# Patient Record
Sex: FEMALE | Race: BLACK OR AFRICAN AMERICAN | Employment: STUDENT | URBAN - METROPOLITAN AREA
[De-identification: names, ages, dates, MRNs, and addresses within clinical notes are randomized per-mention and may not be internally consistent; named-entity substitution may affect disease eponyms.]

---

## 2017-02-09 ENCOUNTER — HOSPITAL ENCOUNTER (EMERGENCY)
Age: 37
Discharge: HOME | End: 2017-02-09
Attending: EMERGENCY MEDICINE

## 2017-02-09 VITALS
BODY MASS INDEX: 33.84 KG/M2 | RESPIRATION RATE: 18 BRPM | HEIGHT: 63 IN | OXYGEN SATURATION: 100 % | HEART RATE: 84 BPM | TEMPERATURE: 98.6 F | WEIGHT: 191 LBS | SYSTOLIC BLOOD PRESSURE: 182 MMHG | DIASTOLIC BLOOD PRESSURE: 84 MMHG

## 2017-02-09 DIAGNOSIS — B37.9 YEAST INFECTION: Primary | ICD-10-CM

## 2017-02-09 RX ORDER — FLUCONAZOLE 150 MG/1
150 TABLET ORAL
Qty: 1 TAB | Refills: 0 | Status: SHIPPED | OUTPATIENT
Start: 2017-02-09 | End: 2017-02-09

## 2017-02-10 NOTE — UC PROVIDER NOTE
Patient is a 30 y.o. female presenting with yeast infection. The history is provided by the patient. Yeast Infection    This is a new problem. The current episode started more than 2 days ago. The problem occurs constantly. The problem has not changed since onset. The discharge was white and thick. She is not pregnant. Pertinent negatives include no anorexia, no diaphoresis, no abdominal pain, no vomiting and no frequency. Her past medical history does not include irregular periods, PID or STD. Past Medical History   Diagnosis Date    Fibroids         Past Surgical History   Procedure Laterality Date    Hx myomectomy  2016         Family History   Problem Relation Age of Onset    Breast Cancer Mother 36      1 year later   [employer] Stroke Mother     Hypertension Mother     Cancer Father      stomach    Seizures Sister     Breast Cancer Paternal Grandmother     No Known Problems Brother     No Known Problems Sister     No Known Problems Sister         Social History     Social History    Marital status:      Spouse name: N/A    Number of children: N/A    Years of education: N/A     Occupational History    Not on file. Social History Main Topics    Smoking status: Never Smoker    Smokeless tobacco: Never Used    Alcohol use No    Drug use: Yes     Special: Prescription, OTC    Sexual activity: Not Currently     Other Topics Concern    Not on file     Social History Narrative    No narrative on file                ALLERGIES: Review of patient's allergies indicates no known allergies. Review of Systems   Constitutional: Negative for diaphoresis. Gastrointestinal: Negative for abdominal pain, anorexia and vomiting. Genitourinary: Negative for frequency and genital sores.        Vitals:    17 1953   BP: 182/84   Pulse: 84   Resp: 18   Temp: 98.6 °F (37 °C)   SpO2: 100%   Weight: 86.6 kg (191 lb)   Height: 5' 3\" (1.6 m)       Physical Exam   Constitutional: She is oriented to person, place, and time. She appears well-developed and well-nourished. Cardiovascular: Normal rate, regular rhythm and normal heart sounds. Pulmonary/Chest: Effort normal and breath sounds normal.   Abdominal: Soft. Bowel sounds are normal. She exhibits no distension. There is no tenderness. There is no rebound and no guarding. Neurological: She is alert and oriented to person, place, and time. Skin: Skin is warm and dry. Psychiatric: She has a normal mood and affect. Her behavior is normal. Judgment and thought content normal.   Nursing note and vitals reviewed. MDM     Differential Diagnosis; Clinical Impression; Plan:     CLINICAL IMPRESSION:  Yeast infection  (primary encounter diagnosis)    Plan:  1. Diflucan  2.   3.   Risk of Significant Complications, Morbidity, and/or Mortality:   Presenting problems: Moderate  Diagnostic procedures: Moderate  Management options:   Moderate  Progress:   Patient progress:  Stable      Procedures

## 2017-02-10 NOTE — DISCHARGE INSTRUCTIONS
Candidiasis: Care Instructions  Your Care Instructions  Candidiasis (say \"nxj-rlt-YT-uh-lamont\") is a yeast infection. Yeast normally lives in your body. But it can cause problems if your body's defenses don't work as they should. Some medicines can increase your chance of getting a yeast infection. These include antibiotics, steroids, and cancer drugs. And some diseases like AIDS and diabetes can make you more likely to get yeast infections. There are different types of yeast infections. Gisselle Lee is a yeast infection in the mouth. It usually occurs in people with weak immune systems. It causes white patches inside the mouth and throat. Yeast infections of the skin usually occur in skin folds where the skin stays moist. They cause red, oozing patches on your skin. Babies can get these infections under the diaper. People who often wear gloves can get them on their hands. Many women get vaginal yeast infections. They are most common when women take antibiotics. These infections can cause the vagina to itch and burn. They also cause white discharge that looks like cottage cheese. In rare cases, yeast infects the blood. This can cause serious disease. This kind of infection is treated with medicine given through a needle into a vein (IV). After you start treatment, a yeast infection usually goes away quickly. But if your immune system is weak, the infection may come back. Tell your doctor if you get yeast infections often. Follow-up care is a key part of your treatment and safety. Be sure to make and go to all appointments, and call your doctor if you are having problems. It's also a good idea to know your test results and keep a list of the medicines you take. How can you care for yourself at home? · Take your medicines exactly as prescribed. Call your doctor if you think you are having a problem with your medicine. · Use antibiotics only as directed by your doctor. · Eat yogurt with live cultures.  It has bacteria called lactobacillus. It may help prevent some types of yeast infections. · Keep your skin clean and dry. Put powder on moist places. · If you are using a cream or suppository to treat a vaginal yeast infection, don't use condoms or a diaphragm. Use a different type of birth control. · Eat a healthy diet and get regular exercise. This will help keep your immune system strong. When should you call for help? Call your doctor now or seek immediate medical care if:  · You have a fever. · You are pregnant and have signs of a vaginal or urinary tract infection such as:  ¨ Severe itching in your vagina. ¨ Pain during sex or when you urinate. ¨ Unusual discharge from your vagina. ¨ A frequent urge to urinate. ¨ Urine that is cloudy or smells bad. Watch closely for changes in your health, and be sure to contact your doctor if:  · You do not get better as expected. Where can you learn more? Go to http://carter-darcy.info/. Enter H552 in the search box to learn more about \"Candidiasis: Care Instructions. \"  Current as of: February 5, 2016  Content Version: 11.1  © 1752-7138 [employer]. Care instructions adapted under license by [employer] (which disclaims liability or warranty for this information). If you have questions about a medical condition or this instruction, always ask your healthcare professional. [address] any warranty or liability for your use of this information.

## 2017-03-01 ENCOUNTER — OFFICE VISIT (OUTPATIENT)
Dept: INTERNAL MEDICINE CLINIC | Age: 37
End: 2017-03-01

## 2017-03-01 ENCOUNTER — OFFICE VISIT (OUTPATIENT)
Dept: SURGERY | Age: 37
End: 2017-03-01

## 2017-03-01 VITALS
SYSTOLIC BLOOD PRESSURE: 125 MMHG | RESPIRATION RATE: 14 BRPM | HEART RATE: 68 BPM | HEIGHT: 63 IN | TEMPERATURE: 97.5 F | WEIGHT: 194.8 LBS | BODY MASS INDEX: 34.52 KG/M2 | OXYGEN SATURATION: 100 % | DIASTOLIC BLOOD PRESSURE: 84 MMHG

## 2017-03-01 VITALS
RESPIRATION RATE: 15 BRPM | HEART RATE: 66 BPM | WEIGHT: 192 LBS | BODY MASS INDEX: 34.02 KG/M2 | DIASTOLIC BLOOD PRESSURE: 93 MMHG | OXYGEN SATURATION: 99 % | TEMPERATURE: 99 F | HEIGHT: 63 IN | SYSTOLIC BLOOD PRESSURE: 141 MMHG

## 2017-03-01 DIAGNOSIS — R53.83 FATIGUE, UNSPECIFIED TYPE: ICD-10-CM

## 2017-03-01 DIAGNOSIS — D50.0 IRON DEFICIENCY ANEMIA DUE TO CHRONIC BLOOD LOSS: ICD-10-CM

## 2017-03-01 DIAGNOSIS — Z98.890 S/P MYOMECTOMY: ICD-10-CM

## 2017-03-01 DIAGNOSIS — Z09 POSTOP CHECK: Primary | ICD-10-CM

## 2017-03-01 NOTE — PROGRESS NOTES
Brandon Acosta is a 30 y.o. female  Chief Complaint   Patient presents with    Other     patient wants to knoe why she is taking iron pills, has not been diagnosed with anemia    Vaginal Pain     during intercourse, since her surgery    Knee Injury     discoloration left ankle, for 2 weeks, no pain     1. Have you been to the ER, urgent care clinic since your last visit? Hospitalized since your last visit? [facility] r/t yeast infection 2 weeks ago    2. Have you seen or consulted any other health care providers outside of the [address] [place] [address] since your last visit? Include any pap smears or colon screening.  See above

## 2017-03-01 NOTE — MR AVS SNAPSHOT
Visit Information Date & Time Provider Department Dept. Phone Encounter #  
 3/1/2017  9:30 AM Deidra Walker, [address] [facility] [address] [id number] Follow-up Instructions Return if symptoms worsen or fail to improve. Your Appointments 3/1/2017  1:45 PM  
Any with Searcy Koyanagi, MD  
[address] [place] [address] and Primary Care [facility]) Appt Note: follow up  
 [address] [facility] [place]  
  
   
 [address] [id number] Upcoming Health Maintenance Date Due DTaP/Tdap/Td series (1 - Tdap) 1/19/2003 INFLUENZA AGE 9 TO ADULT 8/1/2016 PAP AKA CERVICAL CYTOLOGY 4/16/2018 Allergies as of 3/1/2017  Review Complete On: 3/1/2017 By: Deidra Walker MD  
 No Known Allergies Current Immunizations  Never Reviewed No immunizations on file. Not reviewed this visit You Were Diagnosed With   
  
 Codes Comments Postop check    -  Primary ICD-10-CM: N20 ICD-9-CM: V67.00 Vitals BP  
  
  
  
  
  
 125/84 Vitals History BMI and BSA Data Body Mass Index Body Surface Area 34.51 kg/m 2 1.98 m 2 Preferred Pharmacy Pharmacy Name Phone [facility] [address] [phone] Your Updated Medication List  
  
   
This list is accurate as of: 3/1/17 10:24 AM.  Always use your most recent med list.  
  
  
  
  
 iron, carbonyl 45 mg Tab Commonly known as:  Jose Ramon Outhouse Take 1 Tab by mouth two (2) times daily (after meals). Follow-up Instructions Return if symptoms worsen or fail to improve. Introducing [facility] & HEALTH SERVICES! Dear TANGELA: Thank you for requesting a SpumeNews account. Our records indicate that you already have an active SpumeNews account. You can access your account anytime at [email or web address] Did you know that you can access your hospital and ER discharge instructions at any time in MedManage Systems? You can also review all of your test results from your hospital stay or ER visit. Additional Information If you have questions, please visit the Frequently Asked Questions section of the MedManage Systems website at [email or web address] Remember, MedManage Systems is NOT to be used for urgent needs. For medical emergencies, dial 911. Now available from your iPhone and Android! Please provide this summary of care documentation to your next provider. Your primary care clinician is listed as Nahid Friday. If you have any questions after today's visit, please call [phone].

## 2017-03-01 NOTE — PROGRESS NOTES
SUBJECTIVE:     Magaly Mcgovern is a 30 y.o.  female presents for postop care following:     Patient's last menstrual period was 02/15/2017. The patient is not having any pain. Wagner Shillings Appetite is good. Eating a regular diet without difficulty. Bowel movements are regular. The patient is voiding without difficulty. 7/28/2016    PREOPERATIVE DIAGNOSIS: UTERINE FIBROIDS. SEVERE DYSMENORRHEA. CHRONIC IRON DEFICIENCY ANEMIA.     POSTOPERATIVE  DIAGNOSIS: UTERINE FIBROIDS. SEVERE DYSMENORRHEA. CHRONIC IRON DEFICIENCY ANEMIA.      PROCEDURE: Procedure(s): MYOMECTOMY ROBOTIC ASSISTED    SURGEON:  Martell Topete MD    ASSISTANT:  Guilherme Cavanaugh CSA    ANESTHESIA:  General.    SPECIMENS REMOVED:    ID  Type  Source  Tests  Collected by  Time  Destination    1 : Uterine Fibroids  Preservative  Uterus    Jordan Mar MD  7/28/2016 1036  Pathology        ESTIMATED BLOOD LOSS: 517 cc     COMPLICATIONS:  None. OPERATIVE FINDINGS:  10 cm left anterior fundal fibroid, invading down to the endometrial cavity. 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 ROS: Constitutional: No weight change, chills or fever, anorexia, weakness or sleep disturbance . Cardiovascular: No chest pain, shortness of breath, or palpitations . Respiratory: No cough, shortness of breath, hemoptysis, or orthopnea . Neurologic: No syncope, headaches or seizures . Hematologic: No easy bruising or unusual bleeding . Psychiatric: No insomnia, confusion, depression, or anxiety . GI:No nausea and vomiting, diarrhea or constipation  . : See HPI . Musculoskeletal: No joint pain or muscle pain . Endocrine: No polydipsia, polyuria, cold intolerance, excessive fatigue, or sleep disturbance . Integumentary: No breast pain, lumps, nipple discharge, or axillary lumps .     OBJECTIVE:    Visit Vitals    /84    Pulse 68    Temp 97.5 °F (36.4 °C) (Oral)    Resp 14    Ht 5' 3\" (1.6 m)    Wt 194 lb 12.8 oz (88.4 kg)    SpO2 100%    BMI 34.51 kg/m2       General: alert, cooperative, no distress, appears stated age  Abdomen: soft, bowel sounds active, non-tender  Incision:  healing well, no drainage, no erythema, no hernia, no seroma, no swelling, no dehiscence, incision well approximated  Back: CVA tenderness absent  Pelvic: Pelvic exam: VULVA: normal appearing vulva with no masses, tenderness or lesions, VAGINA: normal appearing vagina with normal color and discharge, no lesions, CERVIX: normal appearing cervix without discharge or lesions, UTERUS: uterus is normal size, shape, consistency and nontender, ADNEXA: normal adnexa in size, nontender and no masses. ASSESSMENT:      ICD-10-CM ICD-9-CM    1. Postop check Z09 V67.00        PLAN:    Refer back to Dr. Kasey Padilla    Follow-up Disposition:  Return if symptoms worsen or fail to improve.

## 2017-03-01 NOTE — MR AVS SNAPSHOT
Visit Information Date & Time Provider Department Dept. Phone Encounter #  
 3/1/2017  1:45 PM MD Radha [facility]ise[facility] and [place] [address] [id number] Follow-up Instructions Return in about 1 month (around 4/1/2017) for Blood Pressure, Weigh Loss. Upcoming Health Maintenance Date Due  
 PAP AKA CERVICAL CYTOLOGY 4/16/2018 DTaP/Tdap/Td series (2 - Td) 5/8/2023 Allergies as of 3/1/2017  Review Complete On: 3/1/2017 By: Rk Abdullahi LPN No Known Allergies Current Immunizations  Never Reviewed No immunizations on file. Not reviewed this visit You Were Diagnosed With   
  
 Codes Comments Elevated blood pressure    -  Primary ICD-10-CM: I10 
ICD-9-CM: 401.9 Fatigue, unspecified type     ICD-10-CM: R53.83 ICD-9-CM: 780.79 Iron deficiency anemia due to chronic blood loss     ICD-10-CM: D50.0 ICD-9-CM: 280.0 S/P myomectomy     ICD-10-CM: R81.391 ICD-9-CM: V45.89 BMI 34.0-34.9,adult     ICD-10-CM: R73.00 
ICD-9-CM: V85.34 Vitals BP  
  
  
  
  
  
 (!) 141/93 (BP 1 Location: Left arm, BP Patient Position: Sitting) Vitals History BMI and BSA Data Body Mass Index Body Surface Area 34.01 kg/m 2 1.97 m 2 Preferred Pharmacy Pharmacy Name Phone [facility] [address] [place] [address] [phone] Your Updated Medication List  
  
   
This list is accurate as of: 3/1/17  3:06 PM.  Always use your most recent med list.  
  
  
  
  
 iron, carbonyl 45 mg Tab Commonly known as:  Glenice Mancia Take 1 Tab by mouth two (2) times daily (after meals). We Performed the Following AMB POC EKG ROUTINE W/ 12 LEADS, INTER & REP [62129 CPT(R)] CBC WITH AUTOMATED DIFF [60394 CPT(R)] LIPID PANEL [55220 CPT(R)] METABOLIC PANEL, BASIC [41260 CPT(R)] OH COLLECTION VENOUS BLOOD,VENIPUNCTURE [id number] CPT(R)] Follow-up Instructions Return in about 1 month (around 4/1/2017) for Blood Pressure, Weigh Loss. Patient Instructions Fatigue: Care Instructions Your Care Instructions Fatigue is a feeling of tiredness, exhaustion, or lack of energy. You may feel fatigue because of too much or not enough activity. It can also come from stress, lack of sleep, boredom, and poor diet. Many medical problems, such as viral infections, can cause fatigue. Emotional problems, especially depression, are often the cause of fatigue. Fatigue is most often a symptom of another problem. Treatment for fatigue depends on the cause. For example, if you have fatigue because you have a certain health problem, treating this problem also treats your fatigue. If depression or anxiety is the cause, treatment may help. Follow-up care is a key part of your treatment and safety. Be sure to make and go to all appointments, and call your doctor if you are having problems. It's also a good idea to know your test results and keep a list of the medicines you take. How can you care for yourself at home? · Get regular exercise. But don't overdo it. Go back and forth between rest and exercise. · Get plenty of rest. 
· Eat a healthy diet. Do not skip meals, especially breakfast. 
· Reduce your use of caffeine, tobacco, and alcohol. Caffeine is most often found in coffee, tea, cola drinks, and chocolate. · Limit medicines that can cause fatigue. This includes tranquilizers and cold and allergy medicines. When should you call for help? Watch closely for changes in your health, and be sure to contact your doctor if: 
· You have new symptoms such as fever or a rash. · Your fatigue gets worse. · You have been feeling down, depressed, or hopeless. Or you may have lost interest in things that you usually enjoy. · You are not getting better as expected. Where can you learn more? Go to http://carter-darcy.info/. Enter Q323 in the search box to learn more about \"Fatigue: Care Instructions. \" Current as of: May 27, 2016 Content Version: 11.1 © 5678-9200 [employer], [employer]. Care instructions adapted under license by [employer] (which disclaims liability or warranty for this information). If you have questions about a medical condition or this instruction, always ask your healthcare professional. [address] any warranty or liability for your use of this information. Introducing [facility] & HEALTH SERVICES! Dear Val Moyer: Thank you for requesting a webme account. Our records indicate that you already have an active webme account. You can access your account anytime at [email or web address] Did you know that you can access your hospital and ER discharge instructions at any time in webme? You can also review all of your test results from your hospital stay or ER visit. Additional Information If you have questions, please visit the Frequently Asked Questions section of the webme website at [email or web address] Remember, webme is NOT to be used for urgent needs. For medical emergencies, dial 911. Now available from your iPhone and Android! Please provide this summary of care documentation to your next provider. Your primary care clinician is listed as Abdulaziz Chris. If you have any questions after today's visit, please call [phone].

## 2017-03-01 NOTE — PATIENT INSTRUCTIONS
Fatigue: Care Instructions  Your Care Instructions  Fatigue is a feeling of tiredness, exhaustion, or lack of energy. You may feel fatigue because of too much or not enough activity. It can also come from stress, lack of sleep, boredom, and poor diet. Many medical problems, such as viral infections, can cause fatigue. Emotional problems, especially depression, are often the cause of fatigue. Fatigue is most often a symptom of another problem. Treatment for fatigue depends on the cause. For example, if you have fatigue because you have a certain health problem, treating this problem also treats your fatigue. If depression or anxiety is the cause, treatment may help. Follow-up care is a key part of your treatment and safety. Be sure to make and go to all appointments, and call your doctor if you are having problems. It's also a good idea to know your test results and keep a list of the medicines you take. How can you care for yourself at home? · Get regular exercise. But don't overdo it. Go back and forth between rest and exercise. · Get plenty of rest.  · Eat a healthy diet. Do not skip meals, especially breakfast.  · Reduce your use of caffeine, tobacco, and alcohol. Caffeine is most often found in coffee, tea, cola drinks, and chocolate. · Limit medicines that can cause fatigue. This includes tranquilizers and cold and allergy medicines. When should you call for help? Watch closely for changes in your health, and be sure to contact your doctor if:  · You have new symptoms such as fever or a rash. · Your fatigue gets worse. · You have been feeling down, depressed, or hopeless. Or you may have lost interest in things that you usually enjoy. · You are not getting better as expected. Where can you learn more? Go to http://carter-darcy.info/. Enter T942 in the search box to learn more about \"Fatigue: Care Instructions. \"  Current as of: May 27, 2016  Content Version: 11.1  © 2636-1857 Healthwise, Incorporated. Care instructions adapted under license by [employer] (which disclaims liability or warranty for this information). If you have questions about a medical condition or this instruction, always ask your healthcare professional. [place] [address] any warranty or liability for your use of this information.

## 2017-03-01 NOTE — PROGRESS NOTES
Ms. Hare is a 30y.o. year old female who had concerns including Other; Vaginal Pain; Knee Injury; and Yeast Infection. HPI:  Chief Complaint   Patient presents with    Other     patient wants to knoe why she is taking iron pills, has not been diagnosed with anemia    Vaginal Pain     during intercourse, since her surgery- seen by gyn surgeon this morning, post op, pt will be seen by gyn to see if ok.  Knee Injury     discoloration left ankle, for 2 weeks, no pain    Yeast Infection     wants to follow up, still having discomfort, was taking diflucan - treated at urgent care, has now cleared up     She has started school   Fatigue,     Past Medical History:   Diagnosis Date    Fibroids      Current Outpatient Prescriptions   Medication Sig Dispense    iron, carbonyl (FEOSOL) 45 mg tab Take 1 Tab by mouth two (2) times daily (after meals). 60 Tab     No current facility-administered medications for this visit. Reviewed PmHx, RxHx, FmHx, SocHx, AllgHx and updated and dated in the chart. ROS: Negative except for BOLD  General: fever, chills, fatigue  Respiratory: cough, SOB, wheezing  Cardiovascular:  CP, palpitation, PELAYO, edema   Gastrointestinal: N/V/D, bleeding  Genito-Urinary: dysuria, hematuria  Musculoskeletal: muscle weakness, pain, swelling    OBJECTIVE:   Visit Vitals    BP (!) 141/93 (BP 1 Location: Left arm, BP Patient Position: Sitting)    Pulse 66    Temp 99 °F (37.2 °C) (Oral)    Resp 15    Ht 5' 3\" (1.6 m)    Wt 192 lb (87.1 kg)    LMP 02/15/2017    SpO2 99%    BMI 34.01 kg/m2     GEN: The patient appears well, alert, oriented x 3, in no distress. ENT: bilateral TM and canal normal.  Neck supple. No adenopathy or thyromegaly. TITO. Lungs: clear bilaterally, good air entry, no wheezes, rhonchi or rales. Cardiovascular: regular rate and rhythm. S1 and S2 normal, no murmurs,  Abdomen: + BS, soft without tenderness, guarding, rebound, mass or organomegaly. Extremities: no edema, normal peripheral pulses. Neurological: normal, gross sensory and motor in tact without focal findings. hypervascularization of right lateral.       Assessment/ Plan:       ICD-10-CM ICD-9-CM    1. Elevated blood pressure I10 401.9 AMB POC EKG ROUTINE W/ 12 LEADS, INTER & REP      LIPID PANEL      SD COLLECTION VENOUS BLOOD,VENIPUNCTURE      METABOLIC PANEL, BASIC      CBC WITH AUTOMATED DIFF   2. Fatigue, unspecified type R53.83 780.79 CBC WITH AUTOMATED DIFF   3. Iron deficiency anemia due to chronic blood loss D50.0 280.0 CBC WITH AUTOMATED DIFF   4. S/P myomectomy Z98.890 V45.89    5. BMI 34.0-34.9,adult Z68.34 V85.34        I have discussed the diagnosis with the patient and the intended plan as seen in the above orders. The patient has received an after-visit summary and questions were answered concerning future plans. Medication Side Effects and Warnings were discussed with patient. Follow-up Disposition:  Return in about 1 month (around 4/1/2017) for Blood Pressure, Weigh Loss.       Luc Fernandes MD

## 2017-03-02 LAB
BASOPHILS # BLD AUTO: 0 X10E3/UL (ref 0–0.2)
BASOPHILS NFR BLD AUTO: 0 %
BUN SERPL-MCNC: 11 MG/DL (ref 6–20)
BUN/CREAT SERPL: 17 (ref 8–20)
CALCIUM SERPL-MCNC: 9.4 MG/DL (ref 8.7–10.2)
CHLORIDE SERPL-SCNC: 104 MMOL/L (ref 96–106)
CHOLEST SERPL-MCNC: 146 MG/DL (ref 100–199)
CO2 SERPL-SCNC: 23 MMOL/L (ref 18–29)
CREAT SERPL-MCNC: 0.66 MG/DL (ref 0.57–1)
EOSINOPHIL # BLD AUTO: 0.1 X10E3/UL (ref 0–0.4)
EOSINOPHIL NFR BLD AUTO: 2 %
ERYTHROCYTE [DISTWIDTH] IN BLOOD BY AUTOMATED COUNT: 18.1 % (ref 12.3–15.4)
GLUCOSE SERPL-MCNC: 88 MG/DL (ref 65–99)
HCT VFR BLD AUTO: 33.3 % (ref 34–46.6)
HDLC SERPL-MCNC: 66 MG/DL
HGB BLD-MCNC: 10 G/DL (ref 11.1–15.9)
IMM GRANULOCYTES # BLD: 0 X10E3/UL (ref 0–0.1)
IMM GRANULOCYTES NFR BLD: 0 %
LDLC SERPL CALC-MCNC: 74 MG/DL (ref 0–99)
LYMPHOCYTES # BLD AUTO: 2.6 X10E3/UL (ref 0.7–3.1)
LYMPHOCYTES NFR BLD AUTO: 38 %
MCH RBC QN AUTO: 23.1 PG (ref 26.6–33)
MCHC RBC AUTO-ENTMCNC: 30 G/DL (ref 31.5–35.7)
MCV RBC AUTO: 77 FL (ref 79–97)
MONOCYTES # BLD AUTO: 1 X10E3/UL (ref 0.1–0.9)
MONOCYTES NFR BLD AUTO: 14 %
NEUTROPHILS # BLD AUTO: 3.2 X10E3/UL (ref 1.4–7)
NEUTROPHILS NFR BLD AUTO: 46 %
PLATELET # BLD AUTO: 335 X10E3/UL (ref 150–379)
POTASSIUM SERPL-SCNC: 4.2 MMOL/L (ref 3.5–5.2)
RBC # BLD AUTO: 4.33 X10E6/UL (ref 3.77–5.28)
SODIUM SERPL-SCNC: 142 MMOL/L (ref 134–144)
TRIGL SERPL-MCNC: 32 MG/DL (ref 0–149)
VLDLC SERPL CALC-MCNC: 6 MG/DL (ref 5–40)
WBC # BLD AUTO: 6.9 X10E3/UL (ref 3.4–10.8)

## 2017-09-08 ENCOUNTER — TELEPHONE (OUTPATIENT)
Dept: INTERNAL MEDICINE CLINIC | Age: 37
End: 2017-09-08

## 2017-09-08 NOTE — TELEPHONE ENCOUNTER
Called and LVM for patient to inform her:  No immunizations in chart, and to bring any records that she has to her [place] [address] office visit 9/11.

## 2017-09-11 ENCOUNTER — OFFICE VISIT (OUTPATIENT)
Dept: INTERNAL MEDICINE CLINIC | Age: 37
End: 2017-09-11

## 2017-09-11 VITALS
RESPIRATION RATE: 16 BRPM | WEIGHT: 198 LBS | TEMPERATURE: 98.5 F | SYSTOLIC BLOOD PRESSURE: 135 MMHG | BODY MASS INDEX: 35.08 KG/M2 | DIASTOLIC BLOOD PRESSURE: 91 MMHG | OXYGEN SATURATION: 97 % | HEIGHT: 63 IN | HEART RATE: 78 BPM

## 2017-09-11 NOTE — PROGRESS NOTES
Chief Complaint   Patient presents with    School/Camp Physical     1. Have you been to the ER, urgent care clinic since your last visit? Hospitalized since your last visit? Yes When: [address] [place] [address]    2. Have you seen or consulted any other health care providers outside of the [address] [place] [address] since your last visit? Include any pap smears or colon screening.  No

## 2017-09-14 ENCOUNTER — OFFICE VISIT (OUTPATIENT)
Dept: INTERNAL MEDICINE CLINIC | Age: 37
End: 2017-09-14

## 2017-09-14 VITALS
SYSTOLIC BLOOD PRESSURE: 141 MMHG | RESPIRATION RATE: 18 BRPM | DIASTOLIC BLOOD PRESSURE: 83 MMHG | TEMPERATURE: 97.9 F | OXYGEN SATURATION: 98 % | HEART RATE: 60 BPM | WEIGHT: 197.2 LBS | BODY MASS INDEX: 34.94 KG/M2 | HEIGHT: 63 IN

## 2017-09-14 DIAGNOSIS — Z01.84 IMMUNITY STATUS TESTING: ICD-10-CM

## 2017-09-14 DIAGNOSIS — Z02.0 SCHOOL PHYSICAL EXAM: Primary | ICD-10-CM

## 2017-09-14 DIAGNOSIS — I10 ESSENTIAL HYPERTENSION: ICD-10-CM

## 2017-09-14 DIAGNOSIS — Z23 ENCOUNTER FOR IMMUNIZATION: ICD-10-CM

## 2017-09-14 DIAGNOSIS — Z11.1 SCREENING-PULMONARY TB: ICD-10-CM

## 2017-09-14 NOTE — PROGRESS NOTES
Ms. Chasidy Bueno is a 30y.o. year old female who had concerns including Complete Physical.    HPI:  Chief Complaint   Patient presents with    Complete Physical     School Form Fiona Felix     HTN  BP Readings from Last 3 Encounters:   09/14/17 141/83   09/11/17 (!) 135/91   03/01/17 (!) 141/93     TB screen negative for hemoptysis, fever, unexplained weight loss, to be exposed population. Patient had BCG vaccine to her childhood. Chest x-ray done within 5 years reportedly negative per patient. Past Medical History:   Diagnosis Date    Fibroids      Current Outpatient Prescriptions   Medication Sig Dispense    iron, carbonyl (FEOSOL) 45 mg tab Take 1 Tab by mouth two (2) times daily (after meals). 60 Tab     No current facility-administered medications for this visit. Reviewed PmHx, RxHx, FmHx, SocHx, AllgHx and updated and dated in the chart. ROS: Negative except for BOLD  General: fever, chills, fatigue  Respiratory: cough, SOB, wheezing  Cardiovascular:  CP, palpitation, PELAYO, edema   Gastrointestinal: N/V/D, bleeding  Genito-Urinary: dysuria, hematuria  Musculoskeletal: muscle weakness, pain, swelling    OBJECTIVE:   Visit Vitals    /83 (BP 1 Location: Right arm, BP Patient Position: Sitting)    Pulse 60    Temp 97.9 °F (36.6 °C) (Oral)    Resp 18    Ht 5' 3\" (1.6 m)    Wt 197 lb 3.2 oz (89.4 kg)    LMP 09/11/2017    SpO2 98%    BMI 34.93 kg/m2     GEN: The patient appears well, alert, oriented x 3, in no distress. ENT: bilateral TM and canal normal.  Neck supple. No adenopathy or thyromegaly. TITO. Lungs: clear bilaterally, good air entry, no wheezes, rhonchi or rales. Cardiovascular: regular rate and rhythm. S1 and S2 normal, no murmurs,  Abdomen: + BS, soft without tenderness, guarding, rebound, mass or organomegaly. Extremities: no edema, normal peripheral pulses.    Neurological: normal, gross sensory and motor in tact without focal findings. Assessment/ Plan:       ICD-10-CM ICD-9-CM    1. School physical exam Z02.0 V70.5    2. Immunity status testing Z01.84 V72.61 MEASLES/MUMPS/RUBELLA IMMUNITY      VZV AB, IGG      HEP B SURFACE AG   3. Encounter for immunization Z23 V03.89 INFLUENZA VIRUS VAC QUAD,SPLIT,PRESV FREE SYRINGE IM      MO IMMUNIZ ADMIN,1 SINGLE/COMB VAC/TOXOID   4. Screening-pulmonary TB Z11.1 V74.1 QUANTIFERON TB GOLD   5. Essential hypertension I10 401.9      immunit statuse, no prior vaccine on file. Med release from [facility]     Hx bcg- safe to do quantiferon TB and not have false posiitve. I have discussed the diagnosis with the patient and the intended plan as seen in the above orders. The patient has received an after-visit summary and questions were answered concerning future plans. Medication Side Effects and Warnings were discussed with patient.     Follow-up Disposition: Not on R Mary Greer MD

## 2017-09-14 NOTE — PROGRESS NOTES
Chief Complaint   Patient presents with   Dannie Maddox Complete Physical     School Form Evelia Pyle

## 2017-09-15 LAB
HBV SURFACE AG SERPL QL IA: NEGATIVE
MEV IGG SER IA-ACNC: 232 AU/ML
MUV IGG SER IA-ACNC: 26.2 AU/ML
RUBV IGG SERPL IA-ACNC: 8.9 INDEX
VZV IGG SER IA-ACNC: <135 INDEX

## 2017-09-21 LAB
ANNOTATION COMMENT IMP: ABNORMAL
GAMMA INTERFERON BACKGROUND BLD IA-ACNC: 0.12 IU/ML
M TB IFN-G BLD-IMP: ABNORMAL
M TB IFN-G CD4+ BCKGRND COR BLD-ACNC: 1.13 IU/ML
M TB IFN-G CD4+ T-CELLS BLD-ACNC: 1.25 IU/ML
MITOGEN IGNF BLD-ACNC: >10 IU/ML
QUANTIFERON INCUBATION: NORMAL
SERVICE CMNT-IMP: ABNORMAL

## 2017-09-26 ENCOUNTER — DOCUMENTATION ONLY (OUTPATIENT)
Dept: INTERNAL MEDICINE CLINIC | Age: 37
End: 2017-09-26

## 2021-02-09 NOTE — MR AVS SNAPSHOT
Visit Information Date & Time Provider Department Dept. Phone Encounter #  
 9/14/2017 10:15 AM [facility] [address] [place] [address] [phone] [id number] Upcoming Health Maintenance Date Due INFLUENZA AGE 9 TO ADULT 8/1/2017 PAP AKA CERVICAL CYTOLOGY 4/16/2018 DTaP/Tdap/Td series (2 - Td) 5/8/2023 Allergies as of 9/14/2017  Review Complete On: 9/14/2017 By: Karon Bertrand No Known Allergies Current Immunizations  Reviewed on 9/8/2017 Name Date Influenza Vaccine (Quad) PF  Incomplete Not reviewed this visit You Were Diagnosed With   
  
 Codes Comments Immunity status testing    -  Primary ICD-10-CM: Z01.84 ICD-9-CM: V72.61 Encounter for immunization     ICD-10-CM: H79 ICD-9-CM: V03.89 Screening-pulmonary TB     ICD-10-CM: Z11.1 ICD-9-CM: V74.1 Essential hypertension     ICD-10-CM: I10 
ICD-9-CM: 401.9 Vitals BP Pulse Temp Resp Height(growth percentile) Weight(growth percentile) 141/83 (BP 1 Location: Right arm, BP Patient Position: Sitting) 60 97.9 °F (36.6 °C) (Oral) 18 5' 3\" (1.6 m) 197 lb 3.2 oz (89.4 kg) LMP SpO2 BMI OB Status Smoking Status 09/11/2017 98% 34.93 kg/m2 Having regular periods Never Smoker BMI and BSA Data Body Mass Index Body Surface Area 34.93 kg/m 2 1.99 m 2 Preferred Pharmacy Pharmacy Name Phone [facility] [address] [facility] [phone] Your Updated Medication List  
  
   
This list is accurate as of: 9/14/17 11:37 AM.  Always use your most recent med list.  
  
  
  
  
 iron, carbonyl 45 mg Tab Commonly known as:  Ruthie Alias Take 1 Tab by mouth two (2) times daily (after meals). We Performed the Following HEP B SURFACE AG [id number] CPT(R)] INFLUENZA VIRUS VAC QUAD,SPLIT,PRESV FREE SYRINGE IM [id number] CPT(R)] MEASLES/MUMPS/RUBELLA IMMUNITY [id number] CPT(R)] AK IMMUNIZ ADMIN,1 SINGLE/COMB VAC/TOXOID [id number] CPT(R)] QUANTIFERON TB GOLD [[id number] Custom] VZV AB, IGG [id number] CPT(R)] Introducing [facility]! Dear Shasta Arguello: Thank you for requesting a [employer] account. Our records indicate that you already have an active Big[employer] account. You can access your account anytime at [email or web address] Did you know that you can access your hospital and ER discharge instructions at any time in BigML? You can also review all of your test results from your hospital stay or ER visit. Additional Information If you have questions, please visit the Frequently Asked Questions section of the BigML website at [email or web address] Remember, BigML is NOT to be used for urgent needs. For medical emergencies, dial 911. Now available from your iPhone and Android! Please provide this summary of care documentation to your next provider. Your primary care clinician is listed as Chacorta Benitez. If you have any questions after today's visit, please call [phone]. 09-Feb-2021 12:27